# Patient Record
Sex: FEMALE | Race: WHITE | Employment: STUDENT | URBAN - METROPOLITAN AREA
[De-identification: names, ages, dates, MRNs, and addresses within clinical notes are randomized per-mention and may not be internally consistent; named-entity substitution may affect disease eponyms.]

---
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
## 2024-11-27 ENCOUNTER — HOSPITAL ENCOUNTER (EMERGENCY)
Facility: HOSPITAL | Age: 19
Discharge: HOME | End: 2024-11-27
Payer: OTHER GOVERNMENT

## 2024-11-27 ENCOUNTER — APPOINTMENT (OUTPATIENT)
Dept: RADIOLOGY | Facility: HOSPITAL | Age: 19
End: 2024-11-27
Payer: OTHER GOVERNMENT

## 2024-11-27 VITALS
TEMPERATURE: 98.5 F | RESPIRATION RATE: 18 BRPM | OXYGEN SATURATION: 98 % | HEIGHT: 63 IN | DIASTOLIC BLOOD PRESSURE: 81 MMHG | HEART RATE: 97 BPM | BODY MASS INDEX: 26.68 KG/M2 | SYSTOLIC BLOOD PRESSURE: 127 MMHG | WEIGHT: 150.57 LBS

## 2024-11-27 DIAGNOSIS — J18.9 PNEUMONIA OF LEFT LOWER LOBE DUE TO INFECTIOUS ORGANISM: Primary | ICD-10-CM

## 2024-11-27 LAB
FLUAV RNA RESP QL NAA+PROBE: NOT DETECTED
FLUBV RNA RESP QL NAA+PROBE: NOT DETECTED
HCG UR QL IA.RAPID: NEGATIVE
SARS-COV-2 RNA RESP QL NAA+PROBE: NOT DETECTED

## 2024-11-27 PROCEDURE — 71046 X-RAY EXAM CHEST 2 VIEWS: CPT

## 2024-11-27 PROCEDURE — 71046 X-RAY EXAM CHEST 2 VIEWS: CPT | Performed by: RADIOLOGY

## 2024-11-27 PROCEDURE — 81025 URINE PREGNANCY TEST: CPT | Performed by: PHYSICIAN ASSISTANT

## 2024-11-27 PROCEDURE — 2500000001 HC RX 250 WO HCPCS SELF ADMINISTERED DRUGS (ALT 637 FOR MEDICARE OP): Performed by: PHYSICIAN ASSISTANT

## 2024-11-27 PROCEDURE — 99283 EMERGENCY DEPT VISIT LOW MDM: CPT | Mod: 25

## 2024-11-27 PROCEDURE — 87636 SARSCOV2 & INF A&B AMP PRB: CPT | Performed by: PHYSICIAN ASSISTANT

## 2024-11-27 RX ORDER — DOXYCYCLINE 100 MG/1
100 CAPSULE ORAL ONCE
Status: COMPLETED | OUTPATIENT
Start: 2024-11-27 | End: 2024-11-27

## 2024-11-27 RX ORDER — DOXYCYCLINE 100 MG/1
100 CAPSULE ORAL 2 TIMES DAILY
Qty: 10 CAPSULE | Refills: 0 | Status: SHIPPED | OUTPATIENT
Start: 2024-11-27 | End: 2024-12-02

## 2024-11-27 RX ORDER — IBUPROFEN 800 MG/1
800 TABLET ORAL ONCE
Status: COMPLETED | OUTPATIENT
Start: 2024-11-27 | End: 2024-11-27

## 2024-11-27 RX ORDER — ACETAMINOPHEN 500 MG
1000 TABLET ORAL ONCE
Status: COMPLETED | OUTPATIENT
Start: 2024-11-27 | End: 2024-11-27

## 2024-11-27 RX ORDER — GUAIFENESIN 600 MG/1
1200 TABLET, EXTENDED RELEASE ORAL 2 TIMES DAILY
Qty: 28 TABLET | Refills: 0 | Status: SHIPPED | OUTPATIENT
Start: 2024-11-27 | End: 2024-12-04

## 2024-11-27 RX ADMIN — ACETAMINOPHEN 1000 MG: 500 TABLET ORAL at 16:02

## 2024-11-27 RX ADMIN — DOXYCYCLINE HYCLATE 100 MG: 100 CAPSULE ORAL at 17:15

## 2024-11-27 RX ADMIN — IBUPROFEN 800 MG: 800 TABLET, FILM COATED ORAL at 16:02

## 2024-11-27 ASSESSMENT — COLUMBIA-SUICIDE SEVERITY RATING SCALE - C-SSRS
1. IN THE PAST MONTH, HAVE YOU WISHED YOU WERE DEAD OR WISHED YOU COULD GO TO SLEEP AND NOT WAKE UP?: NO
6. HAVE YOU EVER DONE ANYTHING, STARTED TO DO ANYTHING, OR PREPARED TO DO ANYTHING TO END YOUR LIFE?: NO
2. HAVE YOU ACTUALLY HAD ANY THOUGHTS OF KILLING YOURSELF?: NO

## 2024-11-27 ASSESSMENT — PAIN - FUNCTIONAL ASSESSMENT
PAIN_FUNCTIONAL_ASSESSMENT: 0-10
PAIN_FUNCTIONAL_ASSESSMENT: 0-10

## 2024-11-27 ASSESSMENT — PAIN SCALES - GENERAL
PAINLEVEL_OUTOF10: 2
PAINLEVEL_OUTOF10: 0 - NO PAIN

## 2024-11-27 NOTE — DISCHARGE INSTRUCTIONS
Chest x-ray today showed left lower lobe pneumonia.  Please take doxycycline and guaifenesin as prescribed.  You may use over-the-counter Tylenol and ibuprofen as needed for body aches and fever.  Ensure adequate hydration with water.  Please return to the emergency department if your symptoms worsen despite oral antibiotics and I recommend following up with your primary care doctor

## 2024-11-27 NOTE — Clinical Note
Munira Prabhakar was seen and treated in our emergency department on 11/27/2024.  She may return to work on 11/30/2024.       If you have any questions or concerns, please don't hesitate to call.      Delmy Thomason PA-C

## 2024-11-27 NOTE — ED PROVIDER NOTES
HPI   Chief Complaint   Patient presents with    Flu Symptoms     Friday I started coughing then I got a headache chills and sweets I felt hot and body aches        This is a 19-year-old female presenting to the emergency department with complaints of subjective fever, chills, body aches and coughing that started this past Friday.  Patient states that over the course of the last 5 days her cough has progressed and she is having night sweats.  She denies any chest pain or shortness of breath.  She states at times when she coughs or takes a deep breath she will have some discomfort in the left lower part of her chest.  She denies any abdominal pain, nausea, vomiting or diarrhea.  She has been eating and drinking normally.  She states that she has been around children who were diagnosed with pneumonia.  She denies any recent travel, recent surgeries, recent hospitalizations, no hemoptysis, no history of DVT or PE.      Please see HPI for pertinent positive and negative ROS.        Patient History   Past Medical History:   Diagnosis Date    Acute upper respiratory infection, unspecified 05/25/2018    Viral upper respiratory tract infection    Dysphagia, unspecified 12/18/2014    Dysphagia    Dyspnea, unspecified 12/18/2014    Dyspnea    Open bite, unspecified lower leg, initial encounter 08/19/2014    Dog bite of calf    Other conditions influencing health status 06/04/2018    History of cough    Pain in unspecified thigh 01/02/2014    Pain of thigh    Personal history of other specified conditions 01/02/2014    History of chest pain     No past surgical history on file.  No family history on file.  Social History     Tobacco Use    Smoking status: Not on file    Smokeless tobacco: Not on file   Substance Use Topics    Alcohol use: Not on file    Drug use: Not on file       Physical Exam   ED Triage Vitals [11/27/24 1542]   Temperature Heart Rate Respirations BP   36.9 °C (98.5 °F) (!) 107 18 130/83      Pulse Ox Temp  Source Heart Rate Source Patient Position   99 % Temporal Monitor Sitting      BP Location FiO2 (%)     Left arm --       Physical Exam  GENERAL APPEARANCE: Awake and alert. No acute distress.   VITAL SIGNS: As per the nurses' triage record.  HEENT: Normocephalic, atraumatic. Extraocular muscles are intact. Conjunctiva are pink. Negative scleral icterus. Mucous membranes are moist. Tongue in the midline.   NECK: Soft, nontender and supple  CHEST: Nontender to palpation. Clear to auscultation bilaterally. Symmetric rise and fall of chest wall.   HEART: Clear S1 and S2. Regular rate and rhythm.  Strong and equal pulses in the extremities.  ABDOMEN: Soft, nontender, nondistended  MUSCULOSKELETAL: The calves are nontender to palpation. Full gross active range of motion. Ambulating on own with no acute difficulties  NEUROLOGICAL: Awake, alert and oriented x 3. Motor power intact in the upper and lower extremities. Sensation is intact to light touch in the upper and lower extremities. Patient answering questions appropriately.   IMMUNOLOGICAL: No lymphatic streaking noted  DERMATOLOGIC: Warm and dry without petechiae, rashes, or ecchymosis noted on visible skin.   PYSCH: Cooperative with appropriate mood and affect.    ED Course & MDM   Diagnoses as of 11/27/24 1713   Pneumonia of left lower lobe due to infectious organism                 No data recorded     [place] Coma Scale Score: 15 (11/27/24 1556 : Alyssa Rivera RN)                           Medical Decision Making  Parts of this chart have been completed using voice recognition software. Please excuse any errors of transcription.  My thought process and reason for plan has been formulated from the time that I saw the patient until the time of disposition and is not specific to one specific moment during their visit and furthermore my MDM encompasses this entire chart and not only this text box.      HPI: Detailed above.    Exam: A medically appropriate exam  performed, outlined above, given the known history and presentation.    History obtained from: Patient    Medications given during visit:  Medications   doxycycline (Vibramycin) capsule 100 mg (has no administration in time range)   acetaminophen (Tylenol) tablet 1,000 mg (1,000 mg oral Given 11/27/24 1602)   ibuprofen tablet 800 mg (800 mg oral Given 11/27/24 1602)        Diagnostic/tests  Labs Reviewed   INFLUENZA A AND B PCR - Normal       Result Value    Flu A Result Not Detected      Flu B Result Not Detected      Narrative:     This assay is an in vitro diagnostic multiplex nucleic acid amplification test for the detection and discrimination of Influenza A & B from nasopharyngeal specimens, and has been validated for use at [facility]. Negative results do not preclude Influenza A/B infections, and should not be used as the sole basis for diagnosis, treatment, or other management decisions. If Influenza A/B and RSV PCR results are negative, testing for Parainfluenza virus, Adenovirus and Metapneumovirus is routinely performed for [facility] pediatric oncology and intensive care inpatients, and is available on other patients by placing an add-on request.   SARS-COV-2 PCR - Normal    Coronavirus 2019, PCR Not Detected      Narrative:     This assay has received FDA Emergency Use Authorization (EUA) and is only authorized for the duration of time that circumstances exist to justify the authorization of the emergency use of in vitro diagnostic tests for the detection of SARS-CoV-2 virus and/or diagnosis of COVID-19 infection under section 564(b)(1) of the Act, 21 U.S.C. 360bbb-3(b)(1). This assay is an in vitro diagnostic nucleic acid amplification test for the qualitative detection of SARS-CoV-2 from nasopharyngeal specimens and has been validated for use at [facility]. Negative results do not preclude COVID-19 infections and should not be used as the sole basis for  diagnosis, treatment, or other management decisions.     HCG, URINE, QUALITATIVE - Normal    HCG, Urine NEGATIVE        XR chest 2 views   Final Result   Suspected left lower lobe pneumonia, best appreciated on the lateral   radiograph.        Signed by: Demar Mcallister 11/27/2024 5:05 PM   Dictation workstation:   [id number]           Considerations/further MDM:    19-year-old female with no significant past medical history presenting to the emergency department with subjective fever, chills, body aches and cough x 5 days.  Differential diagnosis includes but not limited to viral syndrome/acute bronchitis versus pneumonia.  Chest x-ray does show left lower lobe pneumonia.  Negative viral swabs.  Imaging findings are consistent with patient's history and physical exam findings.  I did treat patient with oral Tylenol and oral ibuprofen.  She was given her first dose of oral doxycycline.  Initially when she presented she was mildly tachycardic but tachycardia did resolve with oral Tylenol and oral ibuprofen.  Those were stable.  Due to no comorbidities I do suspect patient will respond well to oral outpatient therapy.  She was given a prescription for doxycycline.  She was told to follow-up with her primary care doctor for reevaluation.  She was given strict return precautions to the emergency department.  Patient verbalized understanding and she felt comfortable discharge plan home.  She was discharged in stable condition.      Procedure  Procedures     Delmy Thomason PA-C  11/28/24 1210